# Patient Record
Sex: FEMALE | Race: BLACK OR AFRICAN AMERICAN | NOT HISPANIC OR LATINO | URBAN - NONMETROPOLITAN AREA
[De-identification: names, ages, dates, MRNs, and addresses within clinical notes are randomized per-mention and may not be internally consistent; named-entity substitution may affect disease eponyms.]

---

## 2017-05-02 PROBLEM — Z79.899: Noted: 2017-05-02

## 2017-05-02 PROBLEM — H52.4: Noted: 2017-05-02

## 2020-10-21 ENCOUNTER — IMPORTED ENCOUNTER (OUTPATIENT)
Dept: URBAN - NONMETROPOLITAN AREA CLINIC 1 | Facility: CLINIC | Age: 55
End: 2020-10-21

## 2020-10-21 PROCEDURE — 92015 DETERMINE REFRACTIVE STATE: CPT

## 2020-10-21 PROCEDURE — 92014 COMPRE OPH EXAM EST PT 1/>: CPT

## 2020-10-21 NOTE — PATIENT DISCUSSION
Presbyopia OUDiscussed refractive status in detail with patient. New glasses Rx given today. Continue to monitor. [facility] ([place])Discussed diagnosis in detail with patient. No maculopathy on today's exam.Continue to monitor.

## 2022-04-09 ASSESSMENT — VISUAL ACUITY
OS_SC: 20/25-
OD_SC: 20/30

## 2022-04-09 ASSESSMENT — TONOMETRY
OD_IOP_MMHG: 16
OS_IOP_MMHG: 16